# Patient Record
Sex: FEMALE | Race: BLACK OR AFRICAN AMERICAN | Employment: OTHER | URBAN - METROPOLITAN AREA
[De-identification: names, ages, dates, MRNs, and addresses within clinical notes are randomized per-mention and may not be internally consistent; named-entity substitution may affect disease eponyms.]

---

## 2022-11-21 ENCOUNTER — TELEPHONE (OUTPATIENT)
Dept: SURGERY | Age: 52
End: 2022-11-21

## 2022-11-21 NOTE — TELEPHONE ENCOUNTER
Tried to reach patient 3 times regarding her interest in the weight loss programs but she has not returned our calls.

## 2022-12-27 ENCOUNTER — OFFICE VISIT (OUTPATIENT)
Dept: SURGERY | Age: 52
End: 2022-12-27
Payer: OTHER GOVERNMENT

## 2022-12-27 VITALS
HEIGHT: 65 IN | OXYGEN SATURATION: 99 % | TEMPERATURE: 97.5 F | BODY MASS INDEX: 48.15 KG/M2 | WEIGHT: 289 LBS | DIASTOLIC BLOOD PRESSURE: 76 MMHG | SYSTOLIC BLOOD PRESSURE: 118 MMHG | RESPIRATION RATE: 15 BRPM | HEART RATE: 83 BPM

## 2022-12-27 DIAGNOSIS — R06.02 SHORTNESS OF BREATH: ICD-10-CM

## 2022-12-27 DIAGNOSIS — G47.33 OBSTRUCTIVE SLEEP APNEA: ICD-10-CM

## 2022-12-27 DIAGNOSIS — I10 PRIMARY HYPERTENSION: ICD-10-CM

## 2022-12-27 DIAGNOSIS — K21.9 GASTROESOPHAGEAL REFLUX DISEASE, UNSPECIFIED WHETHER ESOPHAGITIS PRESENT: ICD-10-CM

## 2022-12-27 DIAGNOSIS — M06.9 RHEUMATOID ARTHRITIS, INVOLVING UNSPECIFIED SITE, UNSPECIFIED WHETHER RHEUMATOID FACTOR PRESENT (HCC): ICD-10-CM

## 2022-12-27 DIAGNOSIS — E66.01 CLASS 3 SEVERE OBESITY IN ADULT, UNSPECIFIED BMI, UNSPECIFIED OBESITY TYPE, UNSPECIFIED WHETHER SERIOUS COMORBIDITY PRESENT (HCC): Primary | ICD-10-CM

## 2022-12-27 PROBLEM — E66.813 CLASS 3 SEVERE OBESITY IN ADULT: Status: ACTIVE | Noted: 2022-12-27

## 2022-12-27 RX ORDER — OMEPRAZOLE 10 MG/1
10 CAPSULE, DELAYED RELEASE ORAL DAILY
COMMUNITY

## 2022-12-27 NOTE — PROGRESS NOTES
Esau Stone is a 47 y.o. female  Chief Complaint   Patient presents with    New Patient     Gastric bypass consult     Visit Vitals  /76 (BP 1 Location: Left lower arm, BP Patient Position: Sitting)   Pulse 83   Temp 97.5 °F (36.4 °C) (Temporal)   Resp 15   Ht 5' 5\" (1.651 m)   Wt 289 lb (131.1 kg)   SpO2 99%   BMI 48.09 kg/m²       Weight Loss Metrics 12/27/2022 12/27/2022 12/27/2021 9/22/2020 3/16/2020   Pre op / Initial Wt 289 - - - -   Today's Wt - 289 lb 278 lb 258 lb 269 lb 2.9 oz   BMI - 48.09 kg/m2 46.26 kg/m2 42.93 kg/m2 44.79 kg/m2   Ideal Body Wt 137 - - - -   Excess Body Wt 152 - - - -   Goal Wt 167 - - - -   Wt loss to date 0 - - - -   % Wt Loss 0 - - - -   80% .6 - - - -       Body mass index is 48.09 kg/m².

## 2022-12-27 NOTE — PROGRESS NOTES
Bariatric Surgery Initial Consult    Patient: Lissa Crouch MRN: [id number]  SSN: [id number]    YOB: 1970  Age: 47 y.o. Sex: female      Subjective:      CC: Morbid Obesity    Current Weight: 289  Body mass index is 48.09 kg/m². Ideal body weight: 57 kg (125 lb 10.6 oz)  Adjusted ideal body weight: 86.6 kg (191 lb)  Excess Body Weight: 152    Lissa Crouch is a 47 y.o. female who is being seen for new bariatric consultation. She has been thinking about surgery for a long period of time. She is currently at her highest weight, and has struggled with obesity most of her adult life. She says that her weight problems were exacerbated during the pandemic. She has tried multiple unsupervised weight loss programs such as B12 shots, diuretics, vegan diet, fasting, and meal plans without sustained success. She also spent 6 months and supervised weight loss with a dietitian early in 2022, and lost 10 pounds as part of that with subsequent recidivism of 15 pounds. The patient has a number of obesity related, and nonobesity related comorbidities:    Dyspnea on exertion: States that this has been present for approximately 3 years. It is definitely worse with activity, but sometimes even slight activity it may flareup. She had followed with a pulmonologist but those records are not immediately available. From her PCP notes it was felt that her spirometry was reportedly normal but there were concerns for the test quality. She has never been treated with any type of inhaler and that appears to be the extent of her pulmonology work-up. CHF: TTE from 6/2022 with EF 61% and grade 1 diastolic dysfunction. Stress test from 7/2021 after she presented with atypical chest pain did not show any defects and an EF of 65%. She does note some increased peripheral edema that has improved with Lasix administration.   She does not currently follow with a cardiologist    Rheumatoid arthritis: Recently diagnosed this year. She was also told she has borderline lupus. She currently takes Plaquenil with only mild relief from that. Her main symptoms are occasional joint pain but she does take Motrin as needed usually a few times a week. GERD: She has had reflux for a number of years but it is better with omeprazole, essentially symptom-free. She denies any dysphagia    IBS: Currently takes Linzess with good results    Past Medical History:   Diagnosis Date    Cancer ([facility] [address] 2012    right breast cancer    Dysplasia of cervix, high grade HERBER 2     Fatty liver     GERD (gastroesophageal reflux disease)     Heavy menstrual period     Hyperlipidemia     Iron deficiency anemia due to chronic blood loss     Mild peripheral edema     Morbid obesity (HCC)     Psychiatric disorder     anxiety     Past Surgical History:   Procedure Laterality Date    HX CHOLECYSTECTOMY      HX GYN      BTL; exploratory lap    HX OVARIAN CYST REMOVAL      ME BREAST SURGERY PROCEDURE UNLISTED      lumpectomy      Allergies   Allergen Reactions    Bactrim [Sulfamethoprim] Unknown (comments)    Lamictal [Lamotrigine] Unknown (comments)    Penicillin G Benzathine Unknown (comments)    Tape [Adhesive] Unknown (comments)     Current Outpatient Medications   Medication Sig Dispense Refill    omeprazole (PRILOSEC) 10 mg capsule Take 10 mg by mouth daily. ezetimibe (ZETIA) 10 mg tablet Take  by mouth. methocarbamoL (Robaxin-750) 750 mg tablet Take 1 Tablet by mouth three (3) times daily as needed for Muscle Spasm(s) or Pain. 12 Tablet 0    ibuprofen (MOTRIN) 600 mg tablet Take 1 Tablet by mouth every eight (8) hours as needed for Pain for up to 20 doses. 20 Tablet 0    buPROPion XL (WELLBUTRIN XL) 300 mg XL tablet Take 300 mg by mouth daily. venlafaxine-SR (EFFEXOR-XR) 150 mg capsule Take 150 mg by mouth daily. fluticasone propionate (FLONASE) 50 mcg/actuation nasal spray 2 Sprays by Both Nostrils route daily.       furosemide (LASIX) 20 mg tablet Take 20 mg by mouth as needed. multivitamin (ONE A DAY) tablet Take 1 Tab by mouth daily. montelukast (SINGULAIR) 10 mg tablet Take 10 mg by mouth daily. ergocalciferol (ERGOCALCIFEROL) 1,250 mcg (50,000 unit) capsule Take 50,000 Units by mouth every seven (7) days. Social History     Tobacco Use    Smoking status: Never    Smokeless tobacco: Never   Substance Use Topics    Alcohol use: Never     Family History   Problem Relation Age of Onset    Hypertension Mother     Cancer Mother         breast    Diabetes Mother     Diabetes Other         Last Pap Smear: 11/2022, normal  Mammogram: 2/2022, normal  Colonoscopy: None  EGD/UGI: None  STOP-BANG score: 5; known diagnosis of NATALYA, uses a CPAP  GERD-HRQL score:    Review of Systems:    General: Denies fevers, chills, night sweats, fatigue, weight loss, or weight gain. HEENT: Denies changes in auditory or visual acuity, recurrent pharyngitis, epistaxis, chronic rhinorrhea, vertigo    Respiratory: + Dyspnea on exertion as per HPI    Cardiac: + History of grade 1 diastolic dysfunction without any other known cardiac disease    GI: Denies dysphagia, recurrent emesis, hematemesis, changes in bowel habits, hematochezia, melena    : Denies hematuria frequency urgency dysuria    Musculoskeletal: Denies fractures, dislocations    Neurologic: Denies history of CVA, paralysis paresthesias, recurrent cephalgia, seizures    Endocrine: Denies polyuria, polydipsia, polyphagia, heat and cold intolerance    Lymph/heme: Denies a history of malignancy, anemia, bruising, blood transfusions    Integumentary: Negative for dermatitis     Psych: + History of anxiety    Objective:     Vitals:    12/27/22 1300   BP: 118/76   Pulse: 83   Resp: 15   Temp: 97.5 °F (36.4 °C)   TempSrc: Temporal   SpO2: 99%   Weight: 131.1 kg (289 lb)   Height: 5' 5\" (1.651 m)        General: no acute distress, nontoxic in appearance.   Head: Normocephalic, atraumatic  Mouth: Clear, no overt lesions, oral mucosa is pink and moist.  Neck: Supple, no masses, trachea midline  Resp: Clear to auscultation bilaterally, no wheezing. Excursions normal and symmetrical.  Cardio: Regular rate and rhythm, no murmurs  Abdomen: soft, nontender, nondistended, no hernias. Extremities: Warm, well perfused, no tenderness or swelling, normal gait/station, without edema or varicosities  Neuro: Sensation and strength grossly intact and symmetrical.  Psych: Alert and oriented to person, place, and time. Labs:     Lab Results   Component Value Date/Time    WBC 8.6 07/14/2021 11:26 AM    HGB 10.9 (L) 07/14/2021 11:26 AM    HCT 36.2 (L) 07/14/2021 11:26 AM    PLATELET 447 90/36/9311 11:26 AM    MCV 72.3 (L) 07/14/2021 11:26 AM     Lab Results   Component Value Date/Time    Sodium 137 07/14/2021 03:00 PM    Potassium 3.7 07/14/2021 03:00 PM    Chloride 103 07/14/2021 03:00 PM    CO2 31 07/14/2021 03:00 PM    Anion gap 3 (L) 07/14/2021 03:00 PM    Glucose 80 07/14/2021 03:00 PM    BUN 6 (L) 07/14/2021 03:00 PM    Creatinine 0.6 07/14/2021 03:00 PM    GFR est AA >60.0 07/14/2021 03:00 PM    GFR est non-AA >60 07/14/2021 03:00 PM    Calcium 9.0 07/14/2021 03:00 PM    Bilirubin, total 0.3 03/09/2020 02:09 PM    Alk. phosphatase 83 03/09/2020 02:09 PM    Protein, total 7.9 03/09/2020 02:09 PM    Albumin 3.1 (L) 03/09/2020 02:09 PM    ALT (SGPT) 25 03/09/2020 02:09 PM       Assessment: This patient is a 47 y.o. obese female with a current Body mass index is 48.09 kg/m². who is considering bariatric surgery, and would be an appropriate candidate for enrollment in the preoperative pathway. The patient is considering Laparoscopic Sleeve Gastrectomy for surgical weight loss due to their ineffective progress with medical forms of weight loss and the urging of their physicians who care for their primary medical issues.  The patient  now presents  for consideration for weight loss surgery understanding the benefits of this over a medical approach of weight loss as was discussed in our seminar on weight loss surgery. They have discussed their plans both with their family and primary care physician who is in support of their pursuit of such. The patient's goal weight is 180 lb. These goals are consistent with expected outcomes of their desired operation. Her Medical goals are resolution of these health issues. The possible short and long term  complications of the sleeve gastrectomy were also discussed, to include but not limited to;death, DVT/PE, staple line leak, bleeding, stricture formation, surgical site infection, and reflux. I spent a significant amount of time emphasizing the reflux risk from this procedure which ranges from 25-40% postoperatively. Specific weight related outcomes for success were also discussed with an emphasis on careful and close follow-up with the first year and dietary behavior modification over the first years as baseline cyclical hunger returns  The patient expressed an understanding of the above factors, and her questions were answered in their entirety. Plan: Will plan to enroll in the preoperative bariatric pathway  Will schedule consultation with our dietitian  Will schedule for preoperative psych clearance  Standard preoperative bariatric lab panel ordered. H. pylori breath test unable to be completed as she is currently on a PPI. Given her history of reflux as well as rheumatoid arthritis, I would like to perform an EGD to examine the extent of her reflux. We will schedule this for the next available date. Given her moderate to severe dyspnea on exertion and history of CHF, I will refer her for cardiology evaluation preoperatively. Depending on that work-up she may also require pulmonology clearance ultimately for surgery.   She will additionally require PCP clearance  Need to verify colonoscopy status  Return to clinic for midpoint evaluation    I spent >60 minutes on this patient's care today, including reviewing all pertinent medical records, imaging, performing a history and physical exam, documenting, and coordinating future care.     Signed By: Felicia Morales MD     December 27, 2022

## 2022-12-27 NOTE — LETTER
12/27/2022    Patient: Yoly Moyer   YOB: 1970   Date of Visit: 12/27/2022     Angel Wolfe MD  [address] [place] [id number]    Dear Angel Wolfe MD    Thank you for the kind referral of Yoly Moyer to the [facility] [address] [facility]. I saw her for initial consultation today. I think she would be an excellent candidate for bariatric surgery, and as such, have enrolled her in our preoperative pathway. As part of her preoperative workup, I have referred her for cardiology clearance given her history of shortness of breath and mild CHF. I am planning to do an EGD as well given her GERD history preoperatively. Please feel free to reach out to me with any further questions.     Sincerely,    Margaret Contreras MD, FACS, [facility]  Minimally Invasive Bariatric/General Surgery  [facility]

## 2023-01-11 ENCOUNTER — HOSPITAL ENCOUNTER (OUTPATIENT)
Dept: BARIATRICS/WEIGHT MGMT | Age: 53
Discharge: HOME | End: 2023-01-11

## 2023-01-11 ENCOUNTER — DOCUMENTATION ONLY (OUTPATIENT)
Dept: BARIATRICS/WEIGHT MGMT | Age: 53
End: 2023-01-11

## 2023-01-11 NOTE — PROGRESS NOTES
[address] [facility] [place] [address] [facility], [address]    Patient's Name: Kate Coronado   Age: 47 y.o. YOB: 1970   Sex: female    Date:   1/11/2023        Session: 1 of  3  Revision:     Surgeon:  Dr. Jr Hyatt    Height: 5 f 5   Weight:    289      Lbs. BMI:    Pounds Lost since last month: 0                 Pounds Gained since last month: 0    Starting Weight: 289     Previous Months Weight: 289  Overall Pounds Lost: 0   Overall Pounds Gained: 0    Do you smoke? None    Alcohol intake:  Number of drinks at a time:  NOne  Number of times a week: None    Class Guidelines    Guidelines are reviewed with patient at the start of every class. 1. Patient understands that weight loss trial classes must be consecutive. Patient understands if they miss a class, it is their responsibility to contact me to reschedule class. I will reach out to patient after their first no show. 2.  Patient understands the expectations that weight maintenance/weight loss is expected during the classes. Failure to demonstrate changes may result in one extra month of weight loss trial, followed by going back to see the surgeon. 3. Patient is also instructed to be doing their labs, blood work, psych visit, support group and any other test that the surgeon has used while they are working on their weight loss trial.    Other Pertinent Information:     Patient has not attended a support group meeting. Changes Made Since Last Class: Added more protein, lowered carbohydrates. Working on eliminating soda. Eating Habits and Behaviors      Today we reviewed key diet principles. We talked about snack ideas that would focus more on protein. We also talked about the benefits of filling up on protein first and keeping the daily carbohydrate intake to less than 75 grams per day.   Patient was instructed to increase fluid intake to 64 ounces per day and stop all carbonation, caffeine, and sugary drinks. During class, we talked about the importance of getting on a routine of eating 3 meals a day, eating within one hour of waking up, and not going longer than 4 hours without fueling the body again. I also talked with patient about some meal ideas. Patient is currently eating 3 meals per day. Meals focus on protein, vegetables, and carbohydrates. Patient is encouraged to work on cutting starches out. Patient is encouraged to continue to work on getting 64 ounces of fluid per day. Patient is currently drinking water, soda and sweet tea twice a week, 8 ounces of juice a week. Patient has been instructed to stop all liquid calories. We talked about snacks in class that are protein based and cutting out the empty carbohydrates. Patient was also given ideas of different protein shake that could be used as a snack or meal replacement. Physical Activity/Exercise    Comments:     Currently for exercise, patient is not doing anything due to back pain. We talked about activities for patient to do, including walking, swimming, or chair exercises. Goals have been set. Behavior Modification       Comments:   During class, I reviewed a power point with patients called, \"Assessing Your Readiness to Change. \"  During this power point, patient was asked to self-evaluate themselves. At the end, we tallied the scores to determine how ready they are to make changes for the surgery. For the New Year's, I had patient set New Year's resolutions, including a food-related goal, exercise-related goal, and behavior goal.  Patient was encouraged to track the goals on a daily basis using the check off list I provided. Goals should be SMART, specific, measurable, attainable, realistic, and time-orientated. Patient's Goals are:  1. Behavior-Related Goal: Not eating while laying in bed     2. Food-related goal: Eliminate soda, ice cream, and sweet tea    3. Exercise-related goal: Walk 1000 steps a day.       Kristin Parker Nicolas [address] RD  1/11/2023 61.2

## 2023-02-08 ENCOUNTER — DOCUMENTATION ONLY (OUTPATIENT)
Dept: BARIATRICS/WEIGHT MGMT | Age: 53
End: 2023-02-08

## 2023-02-08 NOTE — PROGRESS NOTES
2/8/23:  Patient did not show for her nutrition visit. She did not complete the requirement that was sent on 3 separate occasions. She was contacted and left a voicemail to call me if she was still interested in surgery.     Jazmin Back MS RD

## 2024-04-27 ENCOUNTER — APPOINTMENT (OUTPATIENT)
Facility: HOSPITAL | Age: 54
End: 2024-04-27
Payer: OTHER GOVERNMENT

## 2024-04-27 ENCOUNTER — HOSPITAL ENCOUNTER (EMERGENCY)
Facility: HOSPITAL | Age: 54
Discharge: HOME | End: 2024-04-27
Attending: STUDENT IN AN ORGANIZED HEALTH CARE EDUCATION/TRAINING PROGRAM
Payer: OTHER GOVERNMENT

## 2024-04-27 ENCOUNTER — HOSPITAL ENCOUNTER (EMERGENCY)
Facility: HOSPITAL | Age: 54
End: 2024-04-27
Payer: OTHER GOVERNMENT

## 2024-04-27 VITALS
HEART RATE: 77 BPM | DIASTOLIC BLOOD PRESSURE: 73 MMHG | WEIGHT: 264.55 LBS | SYSTOLIC BLOOD PRESSURE: 123 MMHG | BODY MASS INDEX: 45.17 KG/M2 | RESPIRATION RATE: 17 BRPM | TEMPERATURE: 97.8 F | HEIGHT: 64 IN | OXYGEN SATURATION: 100 %

## 2024-04-27 DIAGNOSIS — R07.89 CHEST PRESSURE: ICD-10-CM

## 2024-04-27 DIAGNOSIS — R79.89 D-DIMER, ELEVATED: ICD-10-CM

## 2024-04-27 DIAGNOSIS — R06.02 SHORTNESS OF BREATH: ICD-10-CM

## 2024-04-27 DIAGNOSIS — R74.8 ALKALINE PHOSPHATASE ELEVATION: ICD-10-CM

## 2024-04-27 DIAGNOSIS — E88.09 HYPOALBUMINEMIA: ICD-10-CM

## 2024-04-27 DIAGNOSIS — R53.1 GENERALIZED WEAKNESS: Primary | ICD-10-CM

## 2024-04-27 DIAGNOSIS — R74.01 TRANSAMINITIS: ICD-10-CM

## 2024-04-27 LAB
ALBUMIN SERPL-MCNC: 3.1 G/DL (ref 3.4–5)
ALBUMIN/GLOB SERPL: 0.6 (ref 0.8–1.7)
ALP SERPL-CCNC: 145 U/L (ref 45–117)
ALT SERPL-CCNC: 65 U/L (ref 13–56)
ANION GAP SERPL CALC-SCNC: 5 MMOL/L (ref 3–18)
APPEARANCE UR: CLEAR
AST SERPL-CCNC: 42 U/L (ref 10–38)
BASOPHILS # BLD: 0.1 K/UL (ref 0–0.1)
BASOPHILS NFR BLD: 1 % (ref 0–2)
BILIRUB DIRECT SERPL-MCNC: 0.1 MG/DL (ref 0–0.2)
BILIRUB SERPL-MCNC: 0.2 MG/DL (ref 0.2–1)
BILIRUB UR QL: NEGATIVE
BUN SERPL-MCNC: 16 MG/DL (ref 7–18)
BUN/CREAT SERPL: 23 (ref 12–20)
CALCIUM SERPL-MCNC: 9.3 MG/DL (ref 8.5–10.1)
CHLORIDE SERPL-SCNC: 101 MMOL/L (ref 100–111)
CK SERPL-CCNC: 48 U/L (ref 26–192)
CO2 SERPL-SCNC: 31 MMOL/L (ref 21–32)
COLOR UR: YELLOW
CREAT SERPL-MCNC: 0.69 MG/DL (ref 0.6–1.3)
D DIMER PPP FEU-MCNC: 2.85 UG/ML(FEU)
DIFFERENTIAL METHOD BLD: ABNORMAL
EOSINOPHIL # BLD: 0.1 K/UL (ref 0–0.4)
EOSINOPHIL NFR BLD: 1 % (ref 0–5)
ERYTHROCYTE [DISTWIDTH] IN BLOOD BY AUTOMATED COUNT: 15.1 % (ref 11.6–14.5)
GLOBULIN SER CALC-MCNC: 5.1 G/DL (ref 2–4)
GLUCOSE BLD STRIP.AUTO-MCNC: 82 MG/DL (ref 70–110)
GLUCOSE SERPL-MCNC: 98 MG/DL (ref 74–99)
GLUCOSE UR STRIP.AUTO-MCNC: NEGATIVE MG/DL
HCT VFR BLD AUTO: 40.6 % (ref 35–45)
HGB BLD-MCNC: 12.6 G/DL (ref 12–16)
HGB UR QL STRIP: NEGATIVE
IMM GRANULOCYTES # BLD AUTO: 0 K/UL (ref 0–0.04)
IMM GRANULOCYTES NFR BLD AUTO: 0 % (ref 0–0.5)
INR PPP: 1 (ref 0.9–1.1)
KETONES UR QL STRIP.AUTO: NEGATIVE MG/DL
LEUKOCYTE ESTERASE UR QL STRIP.AUTO: NEGATIVE
LIPASE SERPL-CCNC: 38 U/L (ref 13–75)
LYMPHOCYTES # BLD: 2.9 K/UL (ref 0.9–3.6)
LYMPHOCYTES NFR BLD: 30 % (ref 21–52)
MAGNESIUM SERPL-MCNC: 2.1 MG/DL (ref 1.6–2.6)
MCH RBC QN AUTO: 22.7 PG (ref 24–34)
MCHC RBC AUTO-ENTMCNC: 31 G/DL (ref 31–37)
MCV RBC AUTO: 73 FL (ref 78–100)
MONOCYTES # BLD: 0.5 K/UL (ref 0.05–1.2)
MONOCYTES NFR BLD: 5 % (ref 3–10)
NEUTS SEG # BLD: 6.2 K/UL (ref 1.8–8)
NEUTS SEG NFR BLD: 63 % (ref 40–73)
NITRITE UR QL STRIP.AUTO: NEGATIVE
NRBC # BLD: 0 K/UL (ref 0–0.01)
NRBC BLD-RTO: 0 PER 100 WBC
NT PRO BNP: <5 PG/ML (ref 0–900)
PH UR STRIP: 7 (ref 5–8)
PHOSPHATE SERPL-MCNC: 3.6 MG/DL (ref 2.5–4.9)
PLATELET # BLD AUTO: 314 K/UL (ref 135–420)
PMV BLD AUTO: 10.5 FL (ref 9.2–11.8)
POTASSIUM SERPL-SCNC: 3.8 MMOL/L (ref 3.5–5.5)
PROT SERPL-MCNC: 8.2 G/DL (ref 6.4–8.2)
PROT UR STRIP-MCNC: NEGATIVE MG/DL
PROTHROMBIN TIME: 13.3 SEC (ref 11.9–14.7)
RBC # BLD AUTO: 5.56 M/UL (ref 4.2–5.3)
SODIUM SERPL-SCNC: 137 MMOL/L (ref 136–145)
SP GR UR REFRACTOMETRY: 1.01 (ref 1–1.03)
T4 FREE SERPL-MCNC: 0.9 NG/DL (ref 0.7–1.5)
TROPONIN I SERPL HS-MCNC: 4 NG/L (ref 0–54)
TROPONIN I SERPL HS-MCNC: 4 NG/L (ref 0–54)
TSH SERPL DL<=0.05 MIU/L-ACNC: 2.1 UIU/ML (ref 0.36–3.74)
UROBILINOGEN UR QL STRIP.AUTO: 0.2 EU/DL (ref 0.2–1)
WBC # BLD AUTO: 9.8 K/UL (ref 4.6–13.2)

## 2024-04-27 PROCEDURE — 72125 CT NECK SPINE W/O DYE: CPT

## 2024-04-27 PROCEDURE — 83735 ASSAY OF MAGNESIUM: CPT

## 2024-04-27 PROCEDURE — 83880 ASSAY OF NATRIURETIC PEPTIDE: CPT

## 2024-04-27 PROCEDURE — 82962 GLUCOSE BLOOD TEST: CPT

## 2024-04-27 PROCEDURE — 71045 X-RAY EXAM CHEST 1 VIEW: CPT

## 2024-04-27 PROCEDURE — 74177 CT ABD & PELVIS W/CONTRAST: CPT

## 2024-04-27 PROCEDURE — 6360000004 HC RX CONTRAST MEDICATION

## 2024-04-27 PROCEDURE — 80076 HEPATIC FUNCTION PANEL: CPT

## 2024-04-27 PROCEDURE — 85025 COMPLETE CBC W/AUTO DIFF WBC: CPT

## 2024-04-27 PROCEDURE — 84484 ASSAY OF TROPONIN QUANT: CPT

## 2024-04-27 PROCEDURE — 84100 ASSAY OF PHOSPHORUS: CPT

## 2024-04-27 PROCEDURE — 82550 ASSAY OF CK (CPK): CPT

## 2024-04-27 PROCEDURE — 81003 URINALYSIS AUTO W/O SCOPE: CPT

## 2024-04-27 PROCEDURE — 99285 EMERGENCY DEPT VISIT HI MDM: CPT

## 2024-04-27 PROCEDURE — 84443 ASSAY THYROID STIM HORMONE: CPT

## 2024-04-27 PROCEDURE — 71275 CT ANGIOGRAPHY CHEST: CPT

## 2024-04-27 PROCEDURE — 83690 ASSAY OF LIPASE: CPT

## 2024-04-27 PROCEDURE — 96360 HYDRATION IV INFUSION INIT: CPT

## 2024-04-27 PROCEDURE — 84439 ASSAY OF FREE THYROXINE: CPT

## 2024-04-27 PROCEDURE — 96361 HYDRATE IV INFUSION ADD-ON: CPT

## 2024-04-27 PROCEDURE — 85610 PROTHROMBIN TIME: CPT

## 2024-04-27 PROCEDURE — 2580000003 HC RX 258: Performed by: STUDENT IN AN ORGANIZED HEALTH CARE EDUCATION/TRAINING PROGRAM

## 2024-04-27 PROCEDURE — 70450 CT HEAD/BRAIN W/O DYE: CPT

## 2024-04-27 PROCEDURE — 80048 BASIC METABOLIC PNL TOTAL CA: CPT

## 2024-04-27 PROCEDURE — 85379 FIBRIN DEGRADATION QUANT: CPT

## 2024-04-27 RX ORDER — SODIUM CHLORIDE, SODIUM LACTATE, POTASSIUM CHLORIDE, AND CALCIUM CHLORIDE .6; .31; .03; .02 G/100ML; G/100ML; G/100ML; G/100ML
1000 INJECTION, SOLUTION INTRAVENOUS ONCE
Status: COMPLETED | OUTPATIENT
Start: 2024-04-27 | End: 2024-04-27

## 2024-04-27 RX ORDER — FLUTICASONE PROPIONATE 50 MCG
2 SPRAY, SUSPENSION (ML) NASAL DAILY
Qty: 16 G | Refills: 0 | Status: SHIPPED | OUTPATIENT
Start: 2024-04-27

## 2024-04-27 RX ORDER — CETIRIZINE HYDROCHLORIDE 10 MG/1
10 TABLET ORAL DAILY
Qty: 30 TABLET | Refills: 0 | Status: SHIPPED | OUTPATIENT
Start: 2024-04-27 | End: 2024-05-27

## 2024-04-27 RX ADMIN — SODIUM CHLORIDE, POTASSIUM CHLORIDE, SODIUM LACTATE AND CALCIUM CHLORIDE 1000 ML: 600; 310; 30; 20 INJECTION, SOLUTION INTRAVENOUS at 17:44

## 2024-04-27 RX ADMIN — IOPAMIDOL 100 ML: 755 INJECTION, SOLUTION INTRAVENOUS at 19:05

## 2024-04-27 ASSESSMENT — ENCOUNTER SYMPTOMS
COUGH: 0
SORE THROAT: 1
VOMITING: 0
ABDOMINAL PAIN: 0
SHORTNESS OF BREATH: 1
BLOOD IN STOOL: 1
DIARRHEA: 0

## 2024-04-27 ASSESSMENT — PAIN - FUNCTIONAL ASSESSMENT: PAIN_FUNCTIONAL_ASSESSMENT: 0-10

## 2024-04-27 ASSESSMENT — PAIN DESCRIPTION - ORIENTATION: ORIENTATION: MID

## 2024-04-27 ASSESSMENT — PAIN SCALES - GENERAL: PAINLEVEL_OUTOF10: 5

## 2024-04-27 ASSESSMENT — PAIN DESCRIPTION - DESCRIPTORS: DESCRIPTORS: PRESSURE

## 2024-04-27 ASSESSMENT — PAIN DESCRIPTION - LOCATION: LOCATION: CHEST

## 2024-04-27 ASSESSMENT — HEART SCORE: ECG: NORMAL

## 2024-04-27 NOTE — ED PROVIDER NOTES
Emergency Department Treatment Report    Patient: Darya Pickard Age: 53 y.o. Sex: female    YOB: 1970 Admit Date: 4/27/2024 PCP: Nena Rain MD   MRN: [id number]  CSN: [id number]     Room: PRECIOUS/PRECIOUS Time Dictated: 10:37 PM        Chief Complaint   Chief Complaint   Patient presents with    Fatigue    Dizziness    Chest Pain    Nausea       History of Present Illness   Darya Pickard is a 53 y.o. female with past medical history of HTN, right breast cancer in remission since 2012 who presents to the ED with the chief complaint of generalized weakness, fatigue, chest pressure, shortness of breath.     Patient reports that for the past 3 weeks she has had constant chest pressure as well as dyspnea on exertion.  For the past week she has had generalized weakness and fatigue, lightheadedness with standing.  She does report history of intermittent bright red blood with wiping, reports history of hemorrhoids.  No melena.  She reports poor oral intake, not eating much, urinating normally but darker in color.  She states that 1 week ago \"she fell out.\"  She states that she felt lightheaded, passed out for few seconds and found herself on the ground.  She does not know whether or not she hit her head.  She does report some neck tightness since that time.    No alcohol use, no drug use, no smoking.  Patient lives with her spouse and daughter, spouse is out of town.    Review of Systems   Review of Systems   Constitutional:  Positive for appetite change and fatigue. Negative for fever.   HENT:  Positive for sore throat.    Eyes:  Negative for visual disturbance.   Respiratory:  Positive for shortness of breath. Negative for cough.    Cardiovascular:  Positive for chest pain. Negative for leg swelling.   Gastrointestinal:  Positive for blood in stool. Negative for abdominal pain, diarrhea and vomiting.   Genitourinary:  Negative for dysuria.   Skin:  Negative for rash.   Neurological:  Positive for weakness.

## 2024-04-27 NOTE — ED TRIAGE NOTES
Pt came via EMS stretcher from home, c/o fatigue, dizziness and nausea x 5 days. Pt also reports midsternal chest pain x 3wks.

## 2024-04-28 LAB
EKG ATRIAL RATE: 73 BPM
EKG DIAGNOSIS: NORMAL
EKG P AXIS: 8 DEGREES
EKG P-R INTERVAL: 150 MS
EKG Q-T INTERVAL: 392 MS
EKG QRS DURATION: 76 MS
EKG QTC CALCULATION (BAZETT): 431 MS
EKG R AXIS: 20 DEGREES
EKG T AXIS: 20 DEGREES
EKG VENTRICULAR RATE: 73 BPM

## 2024-08-20 ENCOUNTER — OFFICE VISIT (OUTPATIENT)
Age: 54
End: 2024-08-20
Payer: OTHER GOVERNMENT

## 2024-08-20 VITALS
OXYGEN SATURATION: 99 % | RESPIRATION RATE: 18 BRPM | BODY MASS INDEX: 47.66 KG/M2 | HEART RATE: 76 BPM | TEMPERATURE: 97 F | DIASTOLIC BLOOD PRESSURE: 68 MMHG | SYSTOLIC BLOOD PRESSURE: 116 MMHG | HEIGHT: 63 IN | WEIGHT: 269 LBS

## 2024-08-20 DIAGNOSIS — E66.01 MORBID OBESITY (HCC): Primary | ICD-10-CM

## 2024-08-20 PROCEDURE — 3078F DIAST BP <80 MM HG: CPT | Performed by: STUDENT IN AN ORGANIZED HEALTH CARE EDUCATION/TRAINING PROGRAM

## 2024-08-20 PROCEDURE — 99215 OFFICE O/P EST HI 40 MIN: CPT | Performed by: STUDENT IN AN ORGANIZED HEALTH CARE EDUCATION/TRAINING PROGRAM

## 2024-08-20 PROCEDURE — 3074F SYST BP LT 130 MM HG: CPT | Performed by: STUDENT IN AN ORGANIZED HEALTH CARE EDUCATION/TRAINING PROGRAM

## 2024-08-20 RX ORDER — SPIRONOLACTONE AND HYDROCHLOROTHIAZIDE 25; 25 MG/1; MG/1
1 TABLET ORAL DAILY
COMMUNITY
Start: 2023-06-27

## 2024-08-20 RX ORDER — LORAZEPAM 0.5 MG/1
0.5 TABLET ORAL EVERY 6 HOURS PRN
COMMUNITY

## 2024-08-20 RX ORDER — MECLIZINE HYDROCHLORIDE 25 MG/1
25 TABLET ORAL 3 TIMES DAILY PRN
COMMUNITY
Start: 2024-05-02

## 2024-08-20 NOTE — PROGRESS NOTES
Chief Complaint   Patient presents with    Surgical Consult     Re-enroll    Pt ID confirmed        8/20/2024     1:43 PM 4/27/2024     9:20 PM 4/27/2024     9:18 PM 4/27/2024     9:15 PM 4/27/2024     9:00 PM 4/27/2024     5:25 PM 12/30/2022     6:50 PM   Ambulatory Bariatric Summary   Systolic  123 119 117 124 128 143   Diastolic  73 76 59 60 72 86   Pulse      77 95   Temp 97 °F (36.1 °C)     97.8 °F (36.6 °C)    Respirations 18     17    Weight - Scale 269     264.55 289   Height 1.6 m (5' 3\")     1.626 m (5' 4\") 1.651 m (5' 5\")   BMI 47.8 kg/m2     45.5 kg/m2 48.2 kg/m2   Weight - Scale 122 kg (269 lb)     120 kg (264 lb 8.8 oz) 131.1 kg (289 lb)   BMI (Calculated) 47.8     45.5 48.2       Body mass index is 47.65 kg/m².

## 2024-08-21 NOTE — PROGRESS NOTES
Bariatric Surgery Initial Consult    Patient: Darya Pickard MRN: [id number]  SSN: [id number]    YOB: 1970  Age: 53 y.o.  Sex: female      Subjective:      CC: Morbid Obesity    Current Weight:   Body mass index is 47.65 kg/m².  Ideal body weight: 52.4 kg (115 lb 8.3 oz)  Adjusted ideal body weight: 80.2 kg (176 lb 14.6 oz)  Excess Body Weight:     Darya Pickard is a 53 y.o. female who is being seen for new bariatric consultation.  She was last seen by me in December 2022.  She did not ultimately progress to surgery at that time as she had a number of life stressors.  She feels that she is in a better place now and able to concentrate on herself and surgery.  She reports a 20-pound weight loss since her last visit two years ago, attributing her previous elevated weight to steroid injections for knee issues and fluid retention from blood pressure medications. She says that her weight problems were exacerbated during the pandemic. She has tried multiple unsupervised weight loss programs such as B12 shots, diuretics, vegan diet, fasting, and meal plans without sustained success. She also spent 6 months and supervised weight loss with a dietitian early in 2022, and lost 10 pounds as part of that with subsequent recidivism of 15 pounds.     The patient has a history of dyspnea on exertion.  She has an upcoming cardiology appointment scheduled for tomorrow.  She does have a history of grade 1 diastolic dysfunction and takes Lasix for peripheral edema. The patient  reports experiencing palpitations and shortness of breath. She was recently diagnosed with rheumatoid arthritis and is taking daily Motrin, which provides minimal relief. The patient discontinued Plaquenil and requires a new rheumatology referral due to her previous provider's half-way. She is pre-diabetic with an A1C of 6.1 but is not currently receiving treatment, anticipating that weight loss surgery will help manage this condition. The

## 2024-09-04 ENCOUNTER — CLINICAL DOCUMENTATION (OUTPATIENT)
Facility: HOSPITAL | Age: 54
End: 2024-09-04

## 2024-09-04 NOTE — PROGRESS NOTES
9/4/24:  Patient did not show for her nutrition visit.  She did not complete the requirement that was sent on 3 separate occasions.  Patient was contacted and left a voicemail to call me if she was still interested in surgery.    Debbie Parikh MS RD

## 2024-09-05 ENCOUNTER — HOSPITAL ENCOUNTER (OUTPATIENT)
Facility: HOSPITAL | Age: 54
Discharge: HOME | End: 2024-09-08
Attending: STUDENT IN AN ORGANIZED HEALTH CARE EDUCATION/TRAINING PROGRAM
Payer: OTHER GOVERNMENT

## 2024-09-05 DIAGNOSIS — E66.01 MORBID OBESITY (HCC): ICD-10-CM

## 2024-09-05 PROCEDURE — 74246 X-RAY XM UPR GI TRC 2CNTRST: CPT

## 2024-09-05 PROCEDURE — 2500000003 HC RX 250 WO HCPCS: Performed by: STUDENT IN AN ORGANIZED HEALTH CARE EDUCATION/TRAINING PROGRAM

## 2024-09-05 PROCEDURE — 6370000000 HC RX 637 (ALT 250 FOR IP): Performed by: STUDENT IN AN ORGANIZED HEALTH CARE EDUCATION/TRAINING PROGRAM

## 2024-09-05 RX ADMIN — ANTACID/ANTIFLATULENT 1 EACH: 380; 550; 10; 10 GRANULE, EFFERVESCENT ORAL at 11:05

## 2024-09-05 RX ADMIN — BARIUM SULFATE 176 G: 960 POWDER, FOR SUSPENSION ORAL at 11:05

## 2024-09-05 RX ADMIN — BARIUM SULFATE 140 ML: 980 POWDER, FOR SUSPENSION ORAL at 11:05

## 2024-10-16 ENCOUNTER — OFFICE VISIT (OUTPATIENT)
Age: 54
End: 2024-10-16

## 2024-10-16 ENCOUNTER — HOSPITAL ENCOUNTER (OUTPATIENT)
Facility: HOSPITAL | Age: 54
Setting detail: SPECIMEN
Discharge: HOME | End: 2024-10-19
Payer: OTHER GOVERNMENT

## 2024-10-16 VITALS
WEIGHT: 261 LBS | SYSTOLIC BLOOD PRESSURE: 132 MMHG | HEIGHT: 63 IN | DIASTOLIC BLOOD PRESSURE: 82 MMHG | RESPIRATION RATE: 18 BRPM | OXYGEN SATURATION: 98 % | TEMPERATURE: 97.2 F | HEART RATE: 96 BPM | BODY MASS INDEX: 46.25 KG/M2

## 2024-10-16 DIAGNOSIS — E66.01 MORBID OBESITY: ICD-10-CM

## 2024-10-16 DIAGNOSIS — Z01.818 PRE-OP TESTING: ICD-10-CM

## 2024-10-16 DIAGNOSIS — E66.01 MORBID OBESITY: Primary | ICD-10-CM

## 2024-10-16 PROCEDURE — 83013 H PYLORI (C-13) BREATH: CPT

## 2024-10-16 NOTE — PROGRESS NOTES
Bariatric Preoperative Progress Note    Subjective:     Darya Pickard is a 53 y.o. female who presents today for followup of their candidacy for bariatric surgery.  Since last seen, Darya Pickard has been working through our bariatric program towards gastric sleeve with Dr. Woodard  Consult Weight: 269lb  Today's Weight: 261lb    She has not started her weight loss trial classes with DENYS Zuniga. Will perform H pylori testing today and coordinate with DENYS Zuniga regarding getting back in to classes. A mid-trial visit was not performed as she was not currently active in nutrition classes.       Objective:     Physical Exam:  Vitals:    10/16/24 1257   BP: 132/82   Pulse: 96   Resp: 18   Temp: 97.2 °F (36.2 °C)   SpO2: 98%   Weight: 118.4 kg (261 lb)   Height: 1.6 m (5' 3\")      Body mass index is 46.23 kg/m².     Studies to date:    Labs pending  H pylori pending, performed in office today.     UGI: 9/5/2024  1. Gastroesophageal reflux.  2. Small sliding hiatal hernia     Nutritional evaluation: pending    Psychiatric evaluation: pending    Support Group: pending    PCP Clearance: pending    Sleep Study: pending

## 2024-10-19 LAB — UREA BREATH TEST QL: POSITIVE

## 2024-10-21 ENCOUNTER — TELEPHONE (OUTPATIENT)
Age: 54
End: 2024-10-21

## 2024-10-21 RX ORDER — BISMUTH SUBSALICYLATE 262 MG
TABLET,CHEWABLE ORAL
Qty: 112 TABLET | Refills: 0 | Status: SHIPPED | OUTPATIENT
Start: 2024-10-21

## 2024-10-21 RX ORDER — DOXYCYCLINE HYCLATE 100 MG
100 TABLET ORAL 2 TIMES DAILY
Qty: 28 TABLET | Refills: 0 | Status: SHIPPED | OUTPATIENT
Start: 2024-10-21 | End: 2024-11-04

## 2024-10-21 RX ORDER — METRONIDAZOLE 500 MG/1
500 TABLET ORAL 3 TIMES DAILY
Qty: 42 TABLET | Refills: 0 | Status: SHIPPED | OUTPATIENT
Start: 2024-10-21 | End: 2024-11-04

## 2024-10-25 ENCOUNTER — CLINICAL DOCUMENTATION (OUTPATIENT)
Facility: HOSPITAL | Age: 54
End: 2024-10-25

## 2024-10-25 ENCOUNTER — HOSPITAL ENCOUNTER (OUTPATIENT)
Facility: HOSPITAL | Age: 54
Discharge: HOME | End: 2024-10-28

## 2024-10-25 NOTE — PROGRESS NOTES
[facility]  [address], [address]    Patient's Name: Darya Pickard     Age: 53 y.o.  YOB: 1970     Sex: female           Session: 1 of  3  Surgeon:  Dr. Woodard    Height: 5 f 3   Weight:    261      Lbs.     BMI:    Pounds Lost since last month: 8                 Pounds Gained since last month: 0    Starting Weight: 269     Previous Month’s Weight: 269  Overall Pounds Lost: 8   Overall Pounds Gained: 0    Patient has not attended a support group meeting.    Do you smoke?  None    Alcohol intake:  Number of drinks at a time:  None  Number of times a week: None    Class Guidelines    Office Use only: v    Registered Dietitian Initial Class Notes:  n/a    Guidelines are reviewed with patient at the start of every class.    1. Patient understands that weight loss trial classes must be consecutive.  Patient understands if they miss a class, it is their responsibility to contact me to reschedule class.  I will reach out to patient after their first no show.  2.  Patient understands the expectations that weight maintenance/weight loss is expected during the classes.  Failure to demonstrate changes may result in one extra month of weight loss trial, followed by going back to see the surgeon.    3. Patient is also instructed to be doing their labs, blood work, psych visit, support group and any other test that the surgeon has used while they are working on their weight loss trial.  4. Patient is instructed to bring their education binder to all classes.        Changes Made Since Last Class: None    Eating Habits and Behaviors      Today we reviewed key diet principles.   We talked about patient following a low calorie/low carbohydrate diet while they are in weight loss trials.  To achieve this, patient is encouraged to avoid liquid calories, including alcohol, soda, sweet tea, and fruit juices.   Patient can cut carbohydrates by

## 2024-11-22 ENCOUNTER — CLINICAL DOCUMENTATION (OUTPATIENT)
Facility: HOSPITAL | Age: 54
End: 2024-11-22

## 2024-11-22 NOTE — PROGRESS NOTES
11/22/24:  Patient did not show for her nutrition appointment.  She did not complete the requirement that was sent on 3 separate occasions.  I attempted to call her, but her voicemail box is full.  I have e-mailed patient to call me if she was still interested in surgery.    Debbie Parikh MS RD

## 2025-03-21 ENCOUNTER — CLINICAL DOCUMENTATION (OUTPATIENT)
Facility: HOSPITAL | Age: 55
End: 2025-03-21

## 2025-03-21 NOTE — PROGRESS NOTES
3/21/2025:    Patient was previously enrolled in the bariatric program..  Patient stepped away from the bariatric program in November 2024.  Patient was sent a follow up e-mail to see if they were interested in re-enrolling in the program.  Patient was provided with my contact information if they are interested in re-enrolling in the bariatric program.      Debbie Parikh MS RD  3/21/2025